# Patient Record
Sex: FEMALE | URBAN - METROPOLITAN AREA
[De-identification: names, ages, dates, MRNs, and addresses within clinical notes are randomized per-mention and may not be internally consistent; named-entity substitution may affect disease eponyms.]

---

## 2017-05-23 ENCOUNTER — IMPORTED ENCOUNTER (OUTPATIENT)
Dept: URBAN - METROPOLITAN AREA CLINIC 38 | Facility: CLINIC | Age: 82
End: 2017-05-23

## 2017-05-23 PROBLEM — H25.813 COMBINED FORMS OF AGE-RELATED CATARACT, BILATERAL: Noted: 2017-05-23

## 2017-05-23 PROBLEM — H43.813 VITREOUS DEGENERATION, BILATERAL: Noted: 2017-05-23

## 2017-05-23 PROBLEM — E11.9 TYPE II DIABETES WITHOUT COMPLICATIONS: Noted: 2017-05-23

## 2017-05-23 PROCEDURE — 92014 COMPRE OPH EXAM EST PT 1/>: CPT

## 2018-11-30 ENCOUNTER — IMPORTED ENCOUNTER (OUTPATIENT)
Dept: URBAN - METROPOLITAN AREA CLINIC 38 | Facility: CLINIC | Age: 83
End: 2018-11-30

## 2018-11-30 PROBLEM — E11.9 TYPE II DIABETES WITHOUT COMPLICATIONS: Noted: 2018-11-30

## 2018-11-30 PROBLEM — H43.813 VITREOUS DEGENERATION, BILATERAL: Noted: 2018-11-30

## 2018-11-30 PROBLEM — H25.813 COMBINED FORMS OF AGE-RELATED CATARACT, BILATERAL: Noted: 2018-11-30

## 2018-11-30 PROCEDURE — 92015 DETERMINE REFRACTIVE STATE: CPT

## 2018-11-30 PROCEDURE — 92014 COMPRE OPH EXAM EST PT 1/>: CPT

## 2019-10-15 ENCOUNTER — IMPORTED ENCOUNTER (OUTPATIENT)
Dept: URBAN - METROPOLITAN AREA CLINIC 38 | Facility: CLINIC | Age: 84
End: 2019-10-15

## 2019-10-15 PROBLEM — H18.59 OTHER HEREDITARY CORNEAL DYSTROPHY: Noted: 2019-10-15

## 2019-10-15 PROBLEM — H25.813 COMBINED FORMS OF AGE-RELATED CATARACT, BILATERAL: Noted: 2019-10-15

## 2019-10-15 PROBLEM — H43.813 VITREOUS DEGENERATION, BILATERAL: Noted: 2019-10-15

## 2019-10-15 PROBLEM — E11.9 TYPE II DIABETES WITHOUT COMPLICATIONS: Noted: 2019-10-15

## 2019-10-15 PROCEDURE — 92014 COMPRE OPH EXAM EST PT 1/>: CPT

## 2019-10-15 PROCEDURE — 92015 DETERMINE REFRACTIVE STATE: CPT

## 2020-09-08 ENCOUNTER — IMPORTED ENCOUNTER (OUTPATIENT)
Dept: URBAN - METROPOLITAN AREA CLINIC 38 | Facility: CLINIC | Age: 85
End: 2020-09-08

## 2022-06-18 ASSESSMENT — KERATOMETRY
OS_AXISANGLE_DEGREES: 177
OS_K2POWER_DIOPTERS: 43.00
OS_K1POWER_DIOPTERS: 43.50
OS_AXISANGLE_DEGREES: 20
OD_AXISANGLE_DEGREES: 35
OD_AXISANGLE2_DEGREES: 113
OS_K1POWER_DIOPTERS: 43.50
OD_K1POWER_DIOPTERS: 43.25
OS_AXISANGLE2_DEGREES: 110
OD_K2POWER_DIOPTERS: 42.50
OD_AXISANGLE_DEGREES: 21
OD_AXISANGLE_DEGREES: 23
OD_K1POWER_DIOPTERS: 43.00
OD_AXISANGLE2_DEGREES: 125
OS_K1POWER_DIOPTERS: 43.25
OS_AXISANGLE2_DEGREES: 87
OD_K2POWER_DIOPTERS: 43.00
OS_K2POWER_DIOPTERS: 43.00
OD_K1POWER_DIOPTERS: 43.25
OS_AXISANGLE2_DEGREES: 94
OS_K2POWER_DIOPTERS: 42.50
OD_K2POWER_DIOPTERS: 42.50
OS_AXISANGLE_DEGREES: 4
OD_AXISANGLE2_DEGREES: 111

## 2022-06-18 ASSESSMENT — VISUAL ACUITY
OD_BAT: 20/40
OD_CC: 20/25-1
OD_CC: J3
OS_BAT: 20/40
OS_CC: 20/25
OS_BAT: 20/30-
OS_CC: J2
OD_CC: J2
OS_CC: J4
OS_CC: 20/30-1
OD_CC: J3
OS_CC: J3
OS_CC: 20/30-1
OD_CC: 20/25-1
OD_CC: 20/30-1
OD_BAT: 20/40

## 2022-06-18 ASSESSMENT — TONOMETRY
OS_IOP_MMHG: 12
OD_IOP_MMHG: 14
OS_IOP_MMHG: 15
OS_IOP_MMHG: 15
OD_IOP_MMHG: 15
OD_IOP_MMHG: 14

## 2025-07-23 ENCOUNTER — PATIENT OUTREACH (OUTPATIENT)
Dept: OTHER | Facility: HOSPITAL | Age: 89
End: 2025-07-23

## 2025-07-23 NOTE — PROGRESS NOTES
Received VM from previous care manager Willy Lucero.  Chart review done.  Attempted outreach to Willy, who is not available at this time.  Made attempted outreach call to patient, left voice mail to return call.

## 2025-07-23 NOTE — PROGRESS NOTES
Received message from PCP through my director that patient sodium was critically low at 125    Reached out to PCP via Teams messaging following conversation with patient, who reports she received new formula for PEG feedings  Patient denies any any symptoms including headache, lethargy, dizziness, thirst, confusion  States she is actually feeling a little stronger this week than in past week    Patient states she is agreeable to continuing outreach and consented to care management enrollment    Attempted outreach to PCP via tiger text, no response received; attempted outreach to PCP via teams message, she responded that she will respond in epic chat; awaiting response    Attempted outreach to [facility] to determine formula sent to patient yesterday, on hold a total 25 minutes after attempting multiple calls

## 2025-07-24 ENCOUNTER — PATIENT OUTREACH (OUTPATIENT)
Dept: OTHER | Facility: HOSPITAL | Age: 89
End: 2025-07-24

## 2025-07-24 DIAGNOSIS — R11.0 NAUSEA: ICD-10-CM

## 2025-07-24 DIAGNOSIS — I25.10 CORONARY ARTERY DISEASE INVOLVING NATIVE CORONARY ARTERY OF NATIVE HEART WITHOUT ANGINA PECTORIS: ICD-10-CM

## 2025-07-24 DIAGNOSIS — E78.2 MIXED HYPERLIPIDEMIA: Primary | ICD-10-CM

## 2025-07-24 DIAGNOSIS — R05.3 CHRONIC COUGH: ICD-10-CM

## 2025-07-24 DIAGNOSIS — K52.9 CHRONIC DIARRHEA: ICD-10-CM

## 2025-07-24 DIAGNOSIS — I10 PRIMARY HYPERTENSION: ICD-10-CM

## 2025-07-24 DIAGNOSIS — F33.0 MILD EPISODE OF RECURRENT MAJOR DEPRESSIVE DISORDER (HCC): ICD-10-CM

## 2025-07-24 DIAGNOSIS — R52 PAIN: ICD-10-CM

## 2025-07-24 RX ORDER — CARVEDILOL 6.25 MG/1
6.25 TABLET ORAL 2 TIMES DAILY WITH MEALS
COMMUNITY
End: 2025-07-25 | Stop reason: SDUPTHER

## 2025-07-24 RX ORDER — ACETAMINOPHEN 325 MG/1
650 TABLET ORAL EVERY 4 HOURS PRN
COMMUNITY
End: 2025-07-25 | Stop reason: SDUPTHER

## 2025-07-24 RX ORDER — BUPROPION HYDROCHLORIDE 100 MG/1
100 TABLET, EXTENDED RELEASE ORAL 2 TIMES DAILY
COMMUNITY
End: 2025-07-24

## 2025-07-24 RX ORDER — BUPROPION HYDROCHLORIDE 100 MG/1
100 TABLET ORAL 2 TIMES DAILY
COMMUNITY
End: 2025-07-25 | Stop reason: SDUPTHER

## 2025-07-24 NOTE — PROGRESS NOTES
Outreach to patient to confirm her understanding that PCP office will order repeat and serial labs home draw.  Patient verbalizes understanding.  Also made patient aware that home health aides have been arranged for round the clock care until  returns home.  Patient is thankful for the updated information.

## 2025-07-24 NOTE — PROGRESS NOTES
Sent epic chat message to PCP, requested  lab order be resent to [facility] for RN draw  Will follow up with patient

## 2025-07-24 NOTE — PROGRESS NOTES
Chart review done.  PCP requesting repeat labs in one week.  Order sent to [facility] lab, but patient has labs drawn by [facility] RN.  Spoke with patient, explained that this RN would request lab order be sent to [facility] for RN draw.  Patient is agreeable.    Patient stated her  fell 5 minutes earlier and was very dizzy.  Instructed patient to call 911, is agreeable.  PCP made aware and shared concern about patient care while  unavailable.  Left messages for sons [place] and William to make them aware.    Call placed to [facility].  Zaina stated she had just been made aware of the situation, and had arranged for [facility] health aide to remain with patient until overnight relief arrives.  She has been in contact with patient's son William who is enroute to the ER to meet father.    Was informed by Zaina that they cannot do home blood draw, and that patient's last blood draw was done by [facility] mobile lab.    Not[facility] P[facility].  PCP requested this RN take care of order.  Notified PCP via [employer] that her office will need to order mobile draw.

## 2025-07-25 RX ORDER — EZETIMIBE 10 MG/1
10 TABLET ORAL DAILY
COMMUNITY
End: 2025-07-25 | Stop reason: SDUPTHER

## 2025-07-25 RX ORDER — BUPROPION HYDROCHLORIDE 100 MG/1
100 TABLET ORAL 2 TIMES DAILY
Qty: 180 TABLET | Refills: 3 | Status: SHIPPED | OUTPATIENT
Start: 2025-07-25

## 2025-07-25 RX ORDER — CHOLESTYRAMINE 4 G/9G
POWDER, FOR SUSPENSION ORAL
Qty: 90 PACKET | Refills: 3 | Status: SHIPPED | OUTPATIENT
Start: 2025-07-25

## 2025-07-25 RX ORDER — CARVEDILOL 6.25 MG/1
6.25 TABLET ORAL 2 TIMES DAILY WITH MEALS
Qty: 180 TABLET | Refills: 3 | Status: SHIPPED | OUTPATIENT
Start: 2025-07-25

## 2025-07-25 RX ORDER — HYDRALAZINE HYDROCHLORIDE 10 MG/1
10 TABLET, FILM COATED ORAL 2 TIMES DAILY
COMMUNITY
End: 2025-07-25 | Stop reason: SDUPTHER

## 2025-07-25 RX ORDER — CLOPIDOGREL BISULFATE 75 MG/1
75 TABLET ORAL DAILY
Qty: 90 TABLET | Refills: 3 | Status: SHIPPED | OUTPATIENT
Start: 2025-07-25

## 2025-07-25 RX ORDER — LISINOPRIL 20 MG/1
20 TABLET ORAL DAILY
Qty: 90 TABLET | Refills: 3 | Status: SHIPPED | OUTPATIENT
Start: 2025-07-25

## 2025-07-25 RX ORDER — CLOPIDOGREL BISULFATE 75 MG/1
75 TABLET ORAL DAILY
COMMUNITY
End: 2025-07-25 | Stop reason: SDUPTHER

## 2025-07-25 RX ORDER — ACETAMINOPHEN 325 MG/1
650 TABLET ORAL EVERY 4 HOURS PRN
Qty: 90 TABLET | Refills: 3 | Status: SHIPPED | OUTPATIENT
Start: 2025-07-25

## 2025-07-25 RX ORDER — LOPERAMIDE HCL 1 MG/7.5ML
2 SUSPENSION ORAL 4 TIMES DAILY PRN
COMMUNITY
End: 2025-07-25 | Stop reason: SDUPTHER

## 2025-07-25 RX ORDER — LISINOPRIL 20 MG/1
20 TABLET ORAL DAILY
COMMUNITY
End: 2025-07-25 | Stop reason: SDUPTHER

## 2025-07-25 RX ORDER — CHOLESTYRAMINE 4 G/9G
0.5 POWDER, FOR SUSPENSION ORAL 2 TIMES DAILY WITH MEALS
COMMUNITY
End: 2025-07-25 | Stop reason: SDUPTHER

## 2025-07-25 RX ORDER — EZETIMIBE 10 MG/1
10 TABLET ORAL DAILY
Qty: 90 TABLET | Refills: 3 | Status: SHIPPED | OUTPATIENT
Start: 2025-07-25

## 2025-07-25 RX ORDER — LOPERAMIDE HCL 1 MG/7.5ML
2 SUSPENSION ORAL 4 TIMES DAILY PRN
Qty: 360 ML | Refills: 0 | Status: SHIPPED | OUTPATIENT
Start: 2025-07-25

## 2025-07-25 RX ORDER — HYDRALAZINE HYDROCHLORIDE 10 MG/1
10 TABLET, FILM COATED ORAL 2 TIMES DAILY
Qty: 180 TABLET | Refills: 3 | Status: SHIPPED | OUTPATIENT
Start: 2025-07-25

## 2025-07-28 ENCOUNTER — TELEPHONE (OUTPATIENT)
Age: 89
End: 2025-07-28

## 2025-07-28 DIAGNOSIS — R11.0 NAUSEA: Primary | ICD-10-CM

## 2025-07-28 DIAGNOSIS — E87.1 HYPONATREMIA: ICD-10-CM

## 2025-07-28 DIAGNOSIS — I10 PRIMARY HYPERTENSION: ICD-10-CM

## 2025-07-28 RX ORDER — SPIRONOLACTONE 25 MG/1
25 TABLET ORAL DAILY
Qty: 30 TABLET | Refills: 11 | Status: SHIPPED | OUTPATIENT
Start: 2025-07-28

## 2025-07-28 RX ORDER — SPIRONOLACTONE 25 MG/1
25 TABLET ORAL DAILY
COMMUNITY
End: 2025-07-28 | Stop reason: SDUPTHER

## 2025-07-28 RX ORDER — ONDANSETRON HYDROCHLORIDE 4 MG/5ML
4 SOLUTION ORAL 3 TIMES DAILY
Qty: 450 ML | Refills: 11 | Status: SHIPPED | OUTPATIENT
Start: 2025-07-28

## 2025-07-28 RX ORDER — SODIUM CHLORIDE 1 G/1
1 TABLET ORAL
COMMUNITY
End: 2025-07-28 | Stop reason: SDUPTHER

## 2025-07-28 RX ORDER — ONDANSETRON HYDROCHLORIDE 4 MG/5ML
4 SOLUTION ORAL 3 TIMES DAILY
COMMUNITY
End: 2025-07-28 | Stop reason: SDUPTHER

## 2025-07-28 RX ORDER — SODIUM CHLORIDE 1 G/1
1 TABLET ORAL
Qty: 30 TABLET | Refills: 11 | Status: SHIPPED | OUTPATIENT
Start: 2025-07-28

## 2025-07-29 ENCOUNTER — TELEPHONE (OUTPATIENT)
Age: 89
End: 2025-07-29

## 2025-08-04 ENCOUNTER — PATIENT OUTREACH (OUTPATIENT)
Dept: CASE MANAGEMENT | Facility: OTHER | Age: 89
End: 2025-08-04

## 2025-08-11 ENCOUNTER — TELEPHONE (OUTPATIENT)
Age: 89
End: 2025-08-11

## 2025-08-11 ENCOUNTER — PATIENT OUTREACH (OUTPATIENT)
Dept: CASE MANAGEMENT | Facility: OTHER | Age: 89
End: 2025-08-11

## 2025-08-12 ENCOUNTER — TELEPHONE (OUTPATIENT)
Age: 89
End: 2025-08-12

## 2025-08-12 PROBLEM — Z86.73 PERSONAL HISTORY OF TRANSIENT ISCHEMIC ATTACK (TIA), AND CEREBRAL INFARCTION WITHOUT RESIDUAL DEFICITS: Status: ACTIVE | Noted: 2025-08-12

## 2025-08-12 PROBLEM — E11.9 TYPE 2 DIABETES MELLITUS WITHOUT COMPLICATIONS (HCC): Status: ACTIVE | Noted: 2025-08-12

## 2025-08-12 PROBLEM — I47.10 SUPRAVENTRICULAR TACHYCARDIA (HCC): Status: ACTIVE | Noted: 2025-08-12

## 2025-08-12 PROBLEM — R16.1 SPLENOMEGALY, NOT ELSEWHERE CLASSIFIED: Status: ACTIVE | Noted: 2025-08-12

## 2025-08-12 PROBLEM — Z51.5 ENCOUNTER FOR PALLIATIVE CARE: Status: ACTIVE | Noted: 2025-08-12

## 2025-08-12 PROBLEM — I25.10 ATHEROSCLEROTIC HEART DISEASE OF NATIVE CORONARY ARTERY WITHOUT ANGINA PECTORIS: Status: ACTIVE | Noted: 2025-08-12

## 2025-08-12 PROBLEM — M17.12 UNILATERAL PRIMARY OSTEOARTHRITIS, LEFT KNEE: Status: ACTIVE | Noted: 2025-08-12

## 2025-08-12 PROBLEM — I73.9 PERIPHERAL VASCULAR DISEASE, UNSPECIFIED (HCC): Status: ACTIVE | Noted: 2025-08-12

## 2025-08-12 PROBLEM — E87.1 HYPO-OSMOLALITY AND HYPONATREMIA: Status: ACTIVE | Noted: 2025-08-12

## 2025-08-12 PROBLEM — Z93.1 GASTROSTOMY STATUS (HCC): Status: ACTIVE | Noted: 2025-08-12

## 2025-08-12 PROBLEM — S41.112A LACERATION WITHOUT FOREIGN BODY OF LEFT UPPER ARM, INITIAL ENCOUNTER: Status: ACTIVE | Noted: 2025-08-12

## 2025-08-12 PROBLEM — I10 ESSENTIAL (PRIMARY) HYPERTENSION: Status: ACTIVE | Noted: 2025-08-12

## 2025-08-12 PROBLEM — K55.20 ANGIODYSPLASIA OF COLON WITHOUT HEMORRHAGE: Status: ACTIVE | Noted: 2025-08-12

## 2025-08-12 PROBLEM — G72.41 INCLUSION BODY MYOSITIS (IBM): Status: ACTIVE | Noted: 2025-08-12

## 2025-08-12 PROBLEM — D69.6 THROMBOCYTOPENIA, UNSPECIFIED (HCC): Status: ACTIVE | Noted: 2025-08-12

## 2025-08-12 PROBLEM — K74.60 UNSPECIFIED CIRRHOSIS OF LIVER (HCC): Status: ACTIVE | Noted: 2025-08-12

## 2025-08-12 PROBLEM — R60.0 LOCALIZED EDEMA: Status: ACTIVE | Noted: 2025-08-12

## 2025-08-12 PROBLEM — Z99.3 DEPENDENCE ON WHEELCHAIR: Status: ACTIVE | Noted: 2025-08-12

## 2025-08-12 PROBLEM — K52.9 NONINFECTIVE GASTROENTERITIS AND COLITIS, UNSPECIFIED: Status: ACTIVE | Noted: 2025-08-12

## 2025-08-12 PROBLEM — M50.30 OTHER CERVICAL DISC DEGENERATION, UNSPECIFIED CERVICAL REGION: Status: ACTIVE | Noted: 2025-08-12

## 2025-08-12 PROBLEM — R07.89 OTHER CHEST PAIN: Status: ACTIVE | Noted: 2025-08-12

## 2025-08-12 PROBLEM — F43.21 ADJUSTMENT DISORDER WITH DEPRESSED MOOD: Status: ACTIVE | Noted: 2025-08-12

## 2025-08-12 PROBLEM — I85.00 ESOPHAGEAL VARICES WITHOUT BLEEDING (HCC): Status: ACTIVE | Noted: 2025-08-12

## 2025-08-12 PROBLEM — Z85.3 PERSONAL HISTORY OF MALIGNANT NEOPLASM OF BREAST: Status: ACTIVE | Noted: 2025-08-12

## 2025-08-12 PROBLEM — Z86.0100 PERSONAL HISTORY OF COLON POLYPS, UNSPECIFIED: Status: ACTIVE | Noted: 2025-08-12

## 2025-08-12 PROBLEM — R53.1 WEAKNESS: Status: ACTIVE | Noted: 2025-08-12

## 2025-08-18 ENCOUNTER — PATIENT OUTREACH (OUTPATIENT)
Dept: CASE MANAGEMENT | Facility: OTHER | Age: 89
End: 2025-08-18

## 2025-08-22 ENCOUNTER — PATIENT OUTREACH (OUTPATIENT)
Dept: CASE MANAGEMENT | Facility: OTHER | Age: 89
End: 2025-08-22

## 2025-08-22 ENCOUNTER — TELEPHONE (OUTPATIENT)
Age: 89
End: 2025-08-22